# Patient Record
Sex: FEMALE | Race: BLACK OR AFRICAN AMERICAN | NOT HISPANIC OR LATINO | Employment: FULL TIME | URBAN - NONMETROPOLITAN AREA
[De-identification: names, ages, dates, MRNs, and addresses within clinical notes are randomized per-mention and may not be internally consistent; named-entity substitution may affect disease eponyms.]

---

## 2017-04-06 ENCOUNTER — OFFICE VISIT (OUTPATIENT)
Dept: CARDIOLOGY | Facility: CLINIC | Age: 67
End: 2017-04-06

## 2017-04-06 VITALS
DIASTOLIC BLOOD PRESSURE: 80 MMHG | SYSTOLIC BLOOD PRESSURE: 140 MMHG | HEART RATE: 64 BPM | HEIGHT: 63 IN | BODY MASS INDEX: 35.44 KG/M2 | OXYGEN SATURATION: 98 % | WEIGHT: 200 LBS

## 2017-04-06 DIAGNOSIS — E66.09 NON MORBID OBESITY DUE TO EXCESS CALORIES: ICD-10-CM

## 2017-04-06 DIAGNOSIS — E78.2 MIXED HYPERLIPIDEMIA: ICD-10-CM

## 2017-04-06 DIAGNOSIS — R60.0 LOCALIZED EDEMA: ICD-10-CM

## 2017-04-06 DIAGNOSIS — I48.20 CHRONIC ATRIAL FIBRILLATION (HCC): Primary | ICD-10-CM

## 2017-04-06 PROCEDURE — 99214 OFFICE O/P EST MOD 30 MIN: CPT | Performed by: INTERNAL MEDICINE

## 2017-04-06 PROCEDURE — 93000 ELECTROCARDIOGRAM COMPLETE: CPT | Performed by: INTERNAL MEDICINE

## 2017-04-06 RX ORDER — DILTIAZEM HYDROCHLORIDE 240 MG/1
240 CAPSULE, COATED, EXTENDED RELEASE ORAL DAILY
Qty: 30 CAPSULE | Refills: 11 | Status: SHIPPED | OUTPATIENT
Start: 2017-04-06 | End: 2018-03-23 | Stop reason: SDUPTHER

## 2017-04-06 NOTE — PROGRESS NOTES
Reason for Visit: cardiovascular follow up.    HPI:  Araceli Chin is a 66 y.o. female is here today for 6 month follow-up.  She has been doing well for the most part.  Her heart rate has been elevated at times.  Nitza Mica told her to ask about increasing her diltiazem.  She has no other issues or complaints.  Her blood pressure has been well controlled.  She remains active and continues to work.      Previous Cardiac Testing and Procedures:  - Echo (2/2/15) EF 60%, mild MR, LA moderately dilated, mild RV dilation, normal function, moderate RA dilatation, mild to moderate TR, mild PI, RVSP 53 mmHg    Patient Active Problem List   Diagnosis   • Mixed hyperlipidemia   • Clotting disorder   • Atrial fibrillation   • Non morbid obesity due to excess calories       Social History   Substance Use Topics   • Smoking status: Former Smoker     Packs/day: 1.50   • Smokeless tobacco: Never Used   • Alcohol use Yes      Comment: occasional       Family History   Problem Relation Age of Onset   • Hypertension Sister        The following portions of the patient's history were reviewed and updated as appropriate: allergies, current medications, past family history, past medical history, past social history, past surgical history and problem list.      Current Outpatient Prescriptions:   •  alendronate (FOSAMAX) 70 MG tablet, Take 70 mg by mouth every 7 days. Alendronate Sodium, Disp: , Rfl:   •  allopurinol (ZYLOPRIM) 300 MG tablet, Take 300 mg by mouth daily., Disp: , Rfl:   •  apixaban (ELIQUIS) 5 MG tablet tablet, Take 5 mg by mouth 2 (two) times a day., Disp: , Rfl:   •  Calcium Citrate-Vitamin D (CALCIUM + D PO), daily., Disp: , Rfl:   •  Calcium-Phosphorus-Vitamin D (CALCIUM GUMMIES PO), Take  by mouth 2 (two) times a day., Disp: , Rfl:   •  Cholecalciferol (VITAMIN D3) 2000 UNITS capsule, 2,000 Units daily., Disp: , Rfl:   •  Cream Base (PCCA LIPODERM BASE EX), Apply  topically every 12 (twelve) hours as needed.  "Lipoderm 5% Patch, Disp: , Rfl:   •  Cyanocobalamin (VITAMIN B 12 PO), Take 2,500 mcg by mouth., Disp: , Rfl:   •  diltiaZEM CD (CARDIZEM CD) 240 MG 24 hr capsule, Take 1 capsule by mouth Daily., Disp: 30 capsule, Rfl: 11  •  Glucosamine HCl-MSM (GLUCOSAMINE-MSM PO), Take  by mouth 3 (three) times a day., Disp: , Rfl:   •  Multiple Vitamins-Minerals (HAIR SKIN AND NAILS FORMULA PO), daily., Disp: , Rfl:   •  Omega-3 Fatty Acids (FISH OIL PO), 2 (two) times a day., Disp: , Rfl:   •  vitamin B-6 (PYRIDOXINE) 100 MG tablet, 100 mg daily., Disp: , Rfl:     Review of Systems   Constitution: Positive for malaise/fatigue. Negative for chills and fever.   HENT: Negative for congestion, headaches and nosebleeds.    Eyes: Negative for blurred vision and double vision.   Cardiovascular: Positive for irregular heartbeat. Negative for chest pain, leg swelling (gout), palpitations and paroxysmal nocturnal dyspnea.   Respiratory: Positive for cough. Negative for shortness of breath.    Endocrine: Positive for cold intolerance and heat intolerance.   Hematologic/Lymphatic: Bruises/bleeds easily.   Skin: Negative for dry skin, itching and rash.   Musculoskeletal: Positive for arthritis, gout and joint pain. Negative for back pain and neck pain.   Gastrointestinal: Negative for abdominal pain, constipation, diarrhea, heartburn and melena.   Genitourinary: Negative for dysuria, frequency and hematuria.   Neurological: Negative for dizziness, light-headedness and numbness.   Psychiatric/Behavioral: Negative for depression. The patient does not have insomnia and is not nervous/anxious.        Objective   /80 (BP Location: Left arm, Patient Position: Sitting, Cuff Size: Adult)  Pulse 64  Ht 63\" (160 cm)  Wt 200 lb (90.7 kg)  SpO2 98%  BMI 35.43 kg/m2  Physical Exam   Constitutional: She is oriented to person, place, and time. She appears well-developed and well-nourished.   HENT:   Head: Normocephalic and atraumatic. "   Cardiovascular: Normal heart sounds.  An irregularly irregular rhythm present. Tachycardia present.    No murmur heard.  Pulmonary/Chest: Effort normal and breath sounds normal.   Musculoskeletal: She exhibits edema.   Neurological: She is alert and oriented to person, place, and time.   Skin: Skin is warm and dry.   Psychiatric: She has a normal mood and affect.       ECG 12 Lead  Date/Time: 4/6/2017 3:02 PM  Performed by: CONCEPCION RAMOS  Authorized by: CONCEPCION RAMOS   Comparison: compared with previous ECG from 2/18/2017  Similar to previous ECG  Rhythm: atrial fibrillation  Rate: tachycardic  QRS axis: right                ICD-10-CM ICD-9-CM   1. Chronic atrial fibrillation I48.2 427.31   2. Mixed hyperlipidemia E78.2 272.2   3. Non morbid obesity due to excess calories E66.09 278.00   4. Localized edema R60.0 782.3         Assessment/Plan:  1.  Atrial fibrillation: Rate controlled with diltiazem and anticoagulated with apixaban.  Heart rate has been running high at times.  Will increase to 240 mg to better control heart rate.  Titrate up further as needed.      2.  Hyperlipidemia: Managed on omega-3 Fish oil and followed by EP.    3.  Obesity: BMI 35, [place] on diet, exercise, and weight loss.    4.  Lower extremity edema:  Did not tolerate compression stockings.  Plans to try compression socks which are less tight.   on low sodium diet, weight loss, and leg elevation.

## 2017-11-09 ENCOUNTER — OFFICE VISIT (OUTPATIENT)
Dept: CARDIOLOGY | Facility: CLINIC | Age: 67
End: 2017-11-09

## 2017-11-09 VITALS
DIASTOLIC BLOOD PRESSURE: 70 MMHG | SYSTOLIC BLOOD PRESSURE: 130 MMHG | BODY MASS INDEX: 34.91 KG/M2 | HEIGHT: 63 IN | HEART RATE: 71 BPM | WEIGHT: 197 LBS | OXYGEN SATURATION: 98 %

## 2017-11-09 DIAGNOSIS — I48.19 PERSISTENT ATRIAL FIBRILLATION (HCC): Primary | ICD-10-CM

## 2017-11-09 DIAGNOSIS — E66.09 NON MORBID OBESITY DUE TO EXCESS CALORIES: ICD-10-CM

## 2017-11-09 DIAGNOSIS — E78.2 MIXED HYPERLIPIDEMIA: ICD-10-CM

## 2017-11-09 PROBLEM — M10.9 GOUT: Status: ACTIVE | Noted: 2017-11-09

## 2017-11-09 PROCEDURE — 93000 ELECTROCARDIOGRAM COMPLETE: CPT | Performed by: INTERNAL MEDICINE

## 2017-11-09 PROCEDURE — 99213 OFFICE O/P EST LOW 20 MIN: CPT | Performed by: INTERNAL MEDICINE

## 2017-11-09 NOTE — PROGRESS NOTES
Reason for Visit: cardiovascular follow up.    HPI:  Araceli Chin is a 66 y.o. female is here today for 6 month follow-up.  She has been active, particularly at work where she does a lot of walking.  Her heart rate is increased with activity but is typically controlled at rest.  Has intermittent fatigue and tiredness.  Has not had any bleeding problems.  Denies any chest pain, dizziness, syncope, PND, or orthopnea.      Previous Cardiac Testing and Procedures:  - Echo (2/2/15) EF 60%, mild MR, LA moderately dilated, mild RV dilation, normal function, moderate RA dilatation, mild to moderate TR, mild PI, RVSP 53 mmHg    Patient Active Problem List   Diagnosis   • Mixed hyperlipidemia   • Clotting disorder   • Atrial fibrillation   • Non morbid obesity due to excess calories   • Gout       Social History   Substance Use Topics   • Smoking status: Former Smoker     Packs/day: 1.50   • Smokeless tobacco: Never Used   • Alcohol use Yes      Comment: occasional       Family History   Problem Relation Age of Onset   • Hypertension Sister        The following portions of the patient's history were reviewed and updated as appropriate: allergies, current medications, past family history, past medical history, past social history, past surgical history and problem list.      Current Outpatient Prescriptions:   •  alendronate (FOSAMAX) 70 MG tablet, Take 70 mg by mouth every 7 days. Alendronate Sodium, Disp: , Rfl:   •  allopurinol (ZYLOPRIM) 300 MG tablet, Take 300 mg by mouth daily., Disp: , Rfl:   •  apixaban (ELIQUIS) 5 MG tablet tablet, Take 5 mg by mouth 2 (two) times a day., Disp: , Rfl:   •  Calcium Citrate-Vitamin D (CALCIUM + D PO), daily., Disp: , Rfl:   •  Calcium-Phosphorus-Vitamin D (CALCIUM GUMMIES PO), Take  by mouth 2 (two) times a day., Disp: , Rfl:   •  Cholecalciferol (VITAMIN D3) 2000 UNITS capsule, 2,000 Units daily., Disp: , Rfl:   •  Cream Base (PCCA LIPODERM BASE EX), Apply  topically every 12  "(twelve) hours as needed. Lipoderm 5% Patch, Disp: , Rfl:   •  Cyanocobalamin (VITAMIN B 12 PO), Take 2,500 mcg by mouth., Disp: , Rfl:   •  diltiaZEM CD (CARDIZEM CD) 240 MG 24 hr capsule, Take 1 capsule by mouth Daily., Disp: 30 capsule, Rfl: 11  •  Glucosamine HCl-MSM (GLUCOSAMINE-MSM PO), Take  by mouth 3 (three) times a day., Disp: , Rfl:   •  Multiple Vitamins-Minerals (HAIR SKIN AND NAILS FORMULA PO), daily., Disp: , Rfl:   •  Omega-3 Fatty Acids (FISH OIL PO), 2 (two) times a day., Disp: , Rfl:   •  vitamin B-6 (PYRIDOXINE) 100 MG tablet, 100 mg daily., Disp: , Rfl:     Review of Systems   Constitution: Positive for malaise/fatigue. Negative for chills and fever.   HENT: Negative for congestion and nosebleeds.    Eyes: Negative for blurred vision and double vision.   Cardiovascular: Negative for chest pain, irregular heartbeat, leg swelling (gout), palpitations and paroxysmal nocturnal dyspnea.   Respiratory: Positive for cough. Negative for shortness of breath.    Endocrine: Positive for cold intolerance and heat intolerance.   Hematologic/Lymphatic: Bruises/bleeds easily.   Skin: Negative for dry skin, itching and rash.   Musculoskeletal: Positive for arthritis, gout and joint pain. Negative for back pain and neck pain.   Gastrointestinal: Negative for abdominal pain, constipation, diarrhea, heartburn and melena.   Genitourinary: Negative for dysuria, frequency and hematuria.   Neurological: Negative for dizziness, headaches, light-headedness and numbness.   Psychiatric/Behavioral: Negative for depression. The patient does not have insomnia and is not nervous/anxious.        Objective   /70 (BP Location: Left arm, Patient Position: Sitting, Cuff Size: Adult)  Pulse 71  Ht 63\" (160 cm)  Wt 197 lb (89.4 kg)  SpO2 98%  BMI 34.9 kg/m2  Physical Exam   Constitutional: She is oriented to person, place, and time. She appears well-developed and well-nourished.   HENT:   Head: Normocephalic and " atraumatic.   Cardiovascular: Normal rate and normal heart sounds.  An irregularly irregular rhythm present.   No murmur heard.  Pulmonary/Chest: Effort normal and breath sounds normal.   Musculoskeletal: She exhibits no edema.   Neurological: She is alert and oriented to person, place, and time.   Skin: Skin is warm and dry.   Psychiatric: She has a normal mood and affect.       ECG 12 Lead  Date/Time: 11/9/2017 3:31 PM  Performed by: CONCEPCION RAMOS  Authorized by: CONCEPCION RAMOS   Comparison: compared with previous ECG from 4/7/2017  Comparison to previous ECG: Heart rate has decreased  Rhythm: atrial fibrillation  Rate: normal  Clinical impression: low voltage              ICD-10-CM ICD-9-CM   1. Persistent atrial fibrillation I48.1 427.31   2. Mixed hyperlipidemia E78.2 272.2   3. Non morbid obesity due to excess calories E66.09 278.00         Assessment/Plan:  1. Persistent atrial fibrillation: Heart rates appear adequately rate controlled on diltiazem.  Continue anticoagulation with Eliquis.       2.  Hyperlipidemia: Managed on omega-3 Fish oil and followed by PCP.     3.  Obesity: BMI 35.  Continue to  on diet, exercise, and weight loss.

## 2018-03-23 DIAGNOSIS — I48.20 CHRONIC ATRIAL FIBRILLATION (HCC): ICD-10-CM

## 2018-03-23 RX ORDER — DILTIAZEM HYDROCHLORIDE 240 MG/1
240 CAPSULE, COATED, EXTENDED RELEASE ORAL DAILY
Qty: 30 CAPSULE | Refills: 11 | Status: SHIPPED | OUTPATIENT
Start: 2018-03-23 | End: 2019-03-23

## 2018-05-17 ENCOUNTER — OFFICE VISIT (OUTPATIENT)
Dept: CARDIOLOGY | Facility: CLINIC | Age: 68
End: 2018-05-17

## 2018-05-17 VITALS
DIASTOLIC BLOOD PRESSURE: 80 MMHG | HEART RATE: 83 BPM | HEIGHT: 63 IN | SYSTOLIC BLOOD PRESSURE: 130 MMHG | OXYGEN SATURATION: 96 % | WEIGHT: 194 LBS | BODY MASS INDEX: 34.38 KG/M2

## 2018-05-17 DIAGNOSIS — R60.0 LOWER EXTREMITY EDEMA: ICD-10-CM

## 2018-05-17 DIAGNOSIS — E78.2 MIXED HYPERLIPIDEMIA: ICD-10-CM

## 2018-05-17 DIAGNOSIS — I10 ESSENTIAL HYPERTENSION: ICD-10-CM

## 2018-05-17 DIAGNOSIS — I48.20 CHRONIC ATRIAL FIBRILLATION (HCC): Primary | ICD-10-CM

## 2018-05-17 DIAGNOSIS — E66.09 NON MORBID OBESITY DUE TO EXCESS CALORIES: ICD-10-CM

## 2018-05-17 PROCEDURE — 93000 ELECTROCARDIOGRAM COMPLETE: CPT | Performed by: INTERNAL MEDICINE

## 2018-05-17 PROCEDURE — 99214 OFFICE O/P EST MOD 30 MIN: CPT | Performed by: INTERNAL MEDICINE

## 2018-05-17 RX ORDER — HYDROCHLOROTHIAZIDE 25 MG/1
25 TABLET ORAL DAILY
Qty: 30 TABLET | Refills: 11 | Status: SHIPPED | OUTPATIENT
Start: 2018-05-17 | End: 2019-05-07 | Stop reason: SDUPTHER

## 2018-05-17 NOTE — PROGRESS NOTES
Reason for Visit: cardiovascular follow up.    HPI:  Araceli Chin is a 67 y.o. female is here today for follow-up.  She has been having worsening swelling recently.  Had some difficult getting her shoes on.  Wears compression stockings at work.  Just got a new bed that raises her legs.  Denies any chest pain, palpitations, dizziness, syncope, PND, or orthopnea.  She does like processed foods such as potato chips and processed meats.    Previous Cardiac Testing and Procedures:  - Echo (2/2/15) EF 60%, mild MR, LA moderately dilated, mild RV dilation, normal function, moderate RA dilatation, mild to moderate TR, mild PI, RVSP 53 mmHg    Patient Active Problem List   Diagnosis   • Mixed hyperlipidemia   • Clotting disorder   • Atrial fibrillation   • Non morbid obesity due to excess calories   • Gout       Social History   Substance Use Topics   • Smoking status: Former Smoker     Packs/day: 1.50   • Smokeless tobacco: Never Used   • Alcohol use Yes      Comment: occasional       Family History   Problem Relation Age of Onset   • Hypertension Sister        The following portions of the patient's history were reviewed and updated as appropriate: allergies, current medications, past family history, past medical history, past social history, past surgical history and problem list.      Current Outpatient Prescriptions:   •  alendronate (FOSAMAX) 70 MG tablet, Take 70 mg by mouth every 7 days. Alendronate Sodium, Disp: , Rfl:   •  allopurinol (ZYLOPRIM) 300 MG tablet, Take 300 mg by mouth daily., Disp: , Rfl:   •  apixaban (ELIQUIS) 5 MG tablet tablet, Take 5 mg by mouth 2 (two) times a day., Disp: , Rfl:   •  Calcium Citrate-Vitamin D (CALCIUM + D PO), daily., Disp: , Rfl:   •  Calcium-Phosphorus-Vitamin D (CALCIUM GUMMIES PO), Take  by mouth 2 (two) times a day., Disp: , Rfl:   •  Cholecalciferol (VITAMIN D3) 2000 UNITS capsule, 2,000 Units daily., Disp: , Rfl:   •  Cream Base (PCCA LIPODERM BASE EX), Apply   "topically every 12 (twelve) hours as needed. Lipoderm 5% Patch, Disp: , Rfl:   •  Cyanocobalamin (VITAMIN B 12 PO), Take 2,500 mcg by mouth., Disp: , Rfl:   •  diltiaZEM CD (CARDIZEM CD) 240 MG 24 hr capsule, Take 1 capsule by mouth Daily., Disp: 30 capsule, Rfl: 11  •  Glucosamine HCl-MSM (GLUCOSAMINE-MSM PO), Take  by mouth 3 (three) times a day., Disp: , Rfl:   •  Multiple Vitamins-Minerals (HAIR SKIN AND NAILS FORMULA PO), daily., Disp: , Rfl:   •  Omega-3 Fatty Acids (FISH OIL PO), 2 (two) times a day., Disp: , Rfl:   •  vitamin B-6 (PYRIDOXINE) 100 MG tablet, 100 mg daily., Disp: , Rfl:   •  hydrochlorothiazide (HYDRODIURIL) 25 MG tablet, Take 1 tablet by mouth Daily., Disp: 30 tablet, Rfl: 11    Review of Systems   Constitution: Positive for malaise/fatigue. Negative for chills and fever.   HENT: Negative for congestion and nosebleeds.    Eyes: Negative for blurred vision and double vision.   Cardiovascular: Positive for leg swelling (gout). Negative for chest pain, irregular heartbeat, palpitations and paroxysmal nocturnal dyspnea.   Respiratory: Positive for shortness of breath. Negative for cough.    Endocrine: Positive for cold intolerance and heat intolerance.   Hematologic/Lymphatic: Bruises/bleeds easily.   Skin: Negative for dry skin, itching and rash.   Musculoskeletal: Positive for arthritis, gout and joint pain. Negative for back pain and neck pain.   Gastrointestinal: Negative for abdominal pain, constipation, diarrhea, heartburn and melena.   Genitourinary: Negative for dysuria, frequency and hematuria.   Neurological: Negative for dizziness, headaches, light-headedness and numbness.   Psychiatric/Behavioral: Negative for depression. The patient does not have insomnia and is not nervous/anxious.        Objective   /80 (BP Location: Left arm, Patient Position: Sitting, Cuff Size: Adult)   Pulse 83   Ht 160 cm (63\")   Wt 88 kg (194 lb)   SpO2 96%   BMI 34.37 kg/m²   Physical Exam "   Constitutional: She is oriented to person, place, and time. She appears well-developed and well-nourished.   HENT:   Head: Normocephalic and atraumatic.   Cardiovascular: Normal rate and normal heart sounds.  An irregularly irregular rhythm present.   No murmur heard.  Pulmonary/Chest: Effort normal and breath sounds normal.   Musculoskeletal: She exhibits edema (1+ LE edema b/l).   Neurological: She is alert and oriented to person, place, and time.   Skin: Skin is warm and dry.   Psychiatric: She has a normal mood and affect.       ECG 12 Lead  Date/Time: 5/17/2018 3:30 PM  Performed by: CONCEPCION RAMOS  Authorized by: CONCEPCION RAMOS   Comparison: compared with previous ECG from 11/9/2017  Similar to previous ECG  Rhythm: atrial fibrillation  Rate: normal  QRS axis: right  Other findings comments: Poor R wave progression  Clinical impression: low voltage              ICD-10-CM ICD-9-CM   1. Chronic atrial fibrillation I48.2 427.31   2. Mixed hyperlipidemia E78.2 272.2   3. Non morbid obesity due to excess calories E66.09 278.00   4. Lower extremity edema R60.0 782.3   5. Essential hypertension I10 401.9         Assessment/Plan:  1. Persistent atrial fibrillation: Continue rate control with diltiazem and anticoagulation with Eliquis.       2.  Hyperlipidemia: Managed on omega-3 Fish oil area and lipid panel followed by PCP.     3.  Obesity: Patient's Body mass index is 34.37 kg/m². BMI is above normal parameters. Recommendations include: exercise counseling and nutrition counseling.     4.  Lower extremity edema: Likely a component of venous insufficiency with contributions from obesity and diet.  On low-sodium diet.  Encourage regular use of compression stockings and leg elevation.  Will add HCTZ as her blood pressure is mildly elevated today.    5.  Hypertension: Blood pressure is mildly elevated today.  No previous diagnosis of hypertension.  Will add hydrochlorothiazide as this may help with both blood  pressure and lower extremity edema.

## 2018-05-31 ENCOUNTER — RESULTS ENCOUNTER (OUTPATIENT)
Dept: CARDIOLOGY | Facility: CLINIC | Age: 68
End: 2018-05-31

## 2018-05-31 DIAGNOSIS — R60.0 LOWER EXTREMITY EDEMA: ICD-10-CM

## 2018-05-31 DIAGNOSIS — I10 ESSENTIAL HYPERTENSION: ICD-10-CM

## 2018-06-06 DIAGNOSIS — E87.6 HYPOKALEMIA: Primary | ICD-10-CM

## 2018-06-06 RX ORDER — POTASSIUM CHLORIDE 750 MG/1
10 TABLET, FILM COATED, EXTENDED RELEASE ORAL DAILY
Qty: 30 TABLET | Refills: 11 | Status: SHIPPED | OUTPATIENT
Start: 2018-06-06

## 2018-11-29 ENCOUNTER — OFFICE VISIT (OUTPATIENT)
Dept: CARDIOLOGY | Facility: CLINIC | Age: 68
End: 2018-11-29

## 2018-11-29 VITALS
BODY MASS INDEX: 34.02 KG/M2 | SYSTOLIC BLOOD PRESSURE: 112 MMHG | OXYGEN SATURATION: 97 % | HEIGHT: 63 IN | HEART RATE: 84 BPM | DIASTOLIC BLOOD PRESSURE: 70 MMHG | WEIGHT: 192 LBS

## 2018-11-29 DIAGNOSIS — I48.20 CHRONIC ATRIAL FIBRILLATION (HCC): Primary | ICD-10-CM

## 2018-11-29 DIAGNOSIS — E78.2 MIXED HYPERLIPIDEMIA: ICD-10-CM

## 2018-11-29 DIAGNOSIS — I10 ESSENTIAL HYPERTENSION: ICD-10-CM

## 2018-11-29 DIAGNOSIS — E66.09 NON MORBID OBESITY DUE TO EXCESS CALORIES: ICD-10-CM

## 2018-11-29 PROCEDURE — 99214 OFFICE O/P EST MOD 30 MIN: CPT | Performed by: INTERNAL MEDICINE

## 2018-11-29 RX ORDER — AMOXICILLIN AND CLAVULANATE POTASSIUM 500; 125 MG/1; MG/1
1 TABLET, FILM COATED ORAL 3 TIMES DAILY
COMMUNITY
End: 2019-06-20

## 2018-11-29 NOTE — PROGRESS NOTES
Reason for Visit: cardiovascular follow up.    HPI:  Araceli Chin is a 67 y.o. female is here today for 6 month follow-up.  She has recently been dealing with bronchitis.  Has not had any recent cardiac symptoms.  She denies any palpitations, dizziness, chest pain, PND, or orthopnea.  Has had a cough, congestion, and wheezing.  Her blood pressure has been well controlled.      Previous Cardiac Testing and Procedures:  - Echo (2/2/15) EF 60%, mild MR, LA moderately dilated, mild RV dilation, normal function, moderate RA dilatation, mild to moderate TR, mild PI, RVSP 53 mmHg    Patient Active Problem List   Diagnosis   • Mixed hyperlipidemia   • Clotting disorder (CMS/HCC)   • Atrial fibrillation (CMS/HCC)   • Non morbid obesity due to excess calories   • Gout   • Essential hypertension       Social History     Tobacco Use   • Smoking status: Former Smoker     Packs/day: 1.50   • Smokeless tobacco: Never Used   Substance Use Topics   • Alcohol use: Yes     Comment: occasional   • Drug use: No       Family History   Problem Relation Age of Onset   • Hypertension Sister        The following portions of the patient's history were reviewed and updated as appropriate: allergies, current medications, past family history, past medical history, past social history, past surgical history and problem list.      Current Outpatient Medications:   •  alendronate (FOSAMAX) 70 MG tablet, Take 70 mg by mouth every 7 days. Alendronate Sodium, Disp: , Rfl:   •  allopurinol (ZYLOPRIM) 300 MG tablet, Take 300 mg by mouth daily., Disp: , Rfl:   •  amoxicillin-clavulanate (AUGMENTIN) 500-125 MG per tablet, Take 1 tablet by mouth 3 (Three) Times a Day., Disp: , Rfl:   •  apixaban (ELIQUIS) 5 MG tablet tablet, Take 5 mg by mouth 2 (two) times a day., Disp: , Rfl:   •  Calcium Citrate-Vitamin D (CALCIUM + D PO), daily., Disp: , Rfl:   •  Calcium-Phosphorus-Vitamin D (CALCIUM GUMMIES PO), Take  by mouth 2 (two) times a day., Disp: ,  "Rfl:   •  Cholecalciferol (VITAMIN D3) 2000 UNITS capsule, 2,000 Units daily., Disp: , Rfl:   •  Cream Base (PCCA LIPODERM BASE EX), Apply  topically every 12 (twelve) hours as needed. Lipoderm 5% Patch, Disp: , Rfl:   •  Cyanocobalamin (VITAMIN B 12 PO), Take 2,500 mcg by mouth., Disp: , Rfl:   •  diltiaZEM CD (CARDIZEM CD) 240 MG 24 hr capsule, Take 1 capsule by mouth Daily., Disp: 30 capsule, Rfl: 11  •  Glucosamine HCl-MSM (GLUCOSAMINE-MSM PO), Take  by mouth 3 (three) times a day., Disp: , Rfl:   •  hydrochlorothiazide (HYDRODIURIL) 25 MG tablet, Take 1 tablet by mouth Daily., Disp: 30 tablet, Rfl: 11  •  Multiple Vitamins-Minerals (HAIR SKIN AND NAILS FORMULA PO), daily., Disp: , Rfl:   •  Omega-3 Fatty Acids (FISH OIL PO), 2 (two) times a day., Disp: , Rfl:   •  potassium chloride (K-DUR) 10 MEQ CR tablet, Take 1 tablet by mouth Daily., Disp: 30 tablet, Rfl: 11  •  vitamin B-6 (PYRIDOXINE) 100 MG tablet, 100 mg daily., Disp: , Rfl:     Review of Systems   Constitution: Negative for chills and fever.   HENT: Positive for congestion.    Cardiovascular: Positive for irregular heartbeat. Negative for chest pain and paroxysmal nocturnal dyspnea.   Respiratory: Positive for cough, shortness of breath and wheezing.    Skin: Negative for rash.   Gastrointestinal: Negative for abdominal pain and heartburn.   Neurological: Negative for dizziness and numbness.       Objective   /70 (BP Location: Left arm, Patient Position: Sitting, Cuff Size: Adult)   Pulse 84   Ht 160 cm (62.99\")   Wt 87.1 kg (192 lb)   SpO2 97%   BMI 34.02 kg/m²   Physical Exam   Constitutional: She is oriented to person, place, and time. She appears well-developed and well-nourished.   HENT:   Head: Normocephalic and atraumatic.   Cardiovascular: Normal rate and normal heart sounds. An irregularly irregular rhythm present.   No murmur heard.  Pulmonary/Chest: Effort normal and breath sounds normal.   Abdominal: She exhibits no distension. " There is no tenderness.   Musculoskeletal: She exhibits no edema.   Neurological: She is alert and oriented to person, place, and time.   Skin: Skin is warm and dry.   Psychiatric: She has a normal mood and affect.     Procedures      ICD-10-CM ICD-9-CM   1. Chronic atrial fibrillation (CMS/[facility]) I48.2 427.31   2. Mixed hyperlipidemia E78.2 272.2   3. Non morbid obesity due to excess calories E66.09 278.00   4. Essential hypertension I10 401.9         Assessment/Plan:  1. Chronic atrial fibrillation: Continue rate control with diltiazem and anticoagulation with Eliquis.       2.  Hyperlipidemia: Managed on omega-3 Fish oil area and lipid panel followed by PCP.     3.  Obesity: Body mass index is 34.02 kg/m².  Tinea  and last all modification and weight loss.     4.  Hypertension: Blood pressure is well controlled today.  Continue current medications

## 2019-03-27 RX ORDER — DILTIAZEM HYDROCHLORIDE 240 MG/1
240 CAPSULE, COATED, EXTENDED RELEASE ORAL DAILY
Qty: 30 CAPSULE | Refills: 11 | Status: SHIPPED | OUTPATIENT
Start: 2019-03-27 | End: 2019-06-20 | Stop reason: SDUPTHER

## 2019-05-07 DIAGNOSIS — I10 ESSENTIAL HYPERTENSION: ICD-10-CM

## 2019-05-07 DIAGNOSIS — R60.0 LOWER EXTREMITY EDEMA: ICD-10-CM

## 2019-05-07 RX ORDER — HYDROCHLOROTHIAZIDE 25 MG/1
25 TABLET ORAL DAILY
Qty: 30 TABLET | Refills: 11 | Status: SHIPPED | OUTPATIENT
Start: 2019-05-07 | End: 2020-04-30

## 2019-06-04 ENCOUNTER — TELEPHONE (OUTPATIENT)
Dept: CARDIOLOGY | Facility: CLINIC | Age: 69
End: 2019-06-04

## 2019-06-20 ENCOUNTER — OFFICE VISIT (OUTPATIENT)
Dept: CARDIOLOGY | Facility: CLINIC | Age: 69
End: 2019-06-20

## 2019-06-20 VITALS
WEIGHT: 193 LBS | DIASTOLIC BLOOD PRESSURE: 80 MMHG | SYSTOLIC BLOOD PRESSURE: 128 MMHG | HEIGHT: 63 IN | BODY MASS INDEX: 34.2 KG/M2 | OXYGEN SATURATION: 94 % | HEART RATE: 97 BPM

## 2019-06-20 DIAGNOSIS — E78.2 MIXED HYPERLIPIDEMIA: ICD-10-CM

## 2019-06-20 DIAGNOSIS — I48.20 CHRONIC ATRIAL FIBRILLATION (HCC): Primary | ICD-10-CM

## 2019-06-20 DIAGNOSIS — I10 ESSENTIAL HYPERTENSION: ICD-10-CM

## 2019-06-20 DIAGNOSIS — E66.09 NON MORBID OBESITY DUE TO EXCESS CALORIES: ICD-10-CM

## 2019-06-20 PROCEDURE — 93000 ELECTROCARDIOGRAM COMPLETE: CPT | Performed by: INTERNAL MEDICINE

## 2019-06-20 PROCEDURE — 99214 OFFICE O/P EST MOD 30 MIN: CPT | Performed by: INTERNAL MEDICINE

## 2019-06-20 RX ORDER — DILTIAZEM HYDROCHLORIDE 300 MG/1
300 CAPSULE, COATED, EXTENDED RELEASE ORAL DAILY
Qty: 30 CAPSULE | Refills: 11 | Status: SHIPPED | OUTPATIENT
Start: 2019-06-20 | End: 2020-06-22

## 2019-06-20 NOTE — PROGRESS NOTES
Reason for Visit: cardiovascular follow up.    HPI:  Araceli Chin is a 68 y.o. female is here today for follow-up.  She has been doing well for the most part.  She has no new complaints or issues.  She denies any chest pain, palpitation, dizziness, syncope, PND, or orthopnea.  She has some fatigue that is relatively unchanged.  She notes an intermittent cough and wants to make sure it is not related to her heart.  She has not noticed any racing of her heart.  Her blood pressure has been reasonably well controlled.  Heart rate is borderline today at 97 bpm on her EKG.    Previous Cardiac Testing and Procedures:  - Echo (2/2/15) EF 60%, mild MR, LA moderately dilated, mild RV dilation, normal function, moderate RA dilatation, mild to moderate TR, mild PI, RVSP 53 mmHg    Patient Active Problem List   Diagnosis   • Mixed hyperlipidemia   • Clotting disorder (CMS/HCC)   • Atrial fibrillation (CMS/HCC)   • Non morbid obesity due to excess calories   • Gout   • Essential hypertension       Social History     Tobacco Use   • Smoking status: Former Smoker     Packs/day: 1.50   • Smokeless tobacco: Never Used   Substance Use Topics   • Alcohol use: Yes     Comment: occasional   • Drug use: No       Family History   Problem Relation Age of Onset   • Hypertension Sister        The following portions of the patient's history were reviewed and updated as appropriate: allergies, current medications, past family history, past medical history, past social history, past surgical history and problem list.      Current Outpatient Medications:   •  allopurinol (ZYLOPRIM) 300 MG tablet, Take 300 mg by mouth daily., Disp: , Rfl:   •  apixaban (ELIQUIS) 5 MG tablet tablet, Take 1 tablet by mouth Every 12 (Twelve) Hours., Disp: 60 tablet, Rfl: 11  •  Calcium Citrate-Vitamin D (CALCIUM + D PO), daily., Disp: , Rfl:   •  Calcium-Phosphorus-Vitamin D (CALCIUM GUMMIES PO), Take  by mouth 2 (two) times a day., Disp: , Rfl:   •   "Cholecalciferol (VITAMIN D3) 2000 UNITS capsule, 2,000 Units daily., Disp: , Rfl:   •  Cream Base (PCCA LIPODERM BASE EX), Apply  topically every 12 (twelve) hours as needed. Lipoderm 5% Patch, Disp: , Rfl:   •  Cyanocobalamin (VITAMIN B 12 PO), Take 2,500 mcg by mouth., Disp: , Rfl:   •  diltiaZEM CD (CARDIZEM CD) 300 MG 24 hr capsule, Take 1 capsule by mouth Daily., Disp: 30 capsule, Rfl: 11  •  Glucosamine HCl-MSM (GLUCOSAMINE-MSM PO), Take  by mouth 3 (three) times a day., Disp: , Rfl:   •  hydrochlorothiazide (HYDRODIURIL) 25 MG tablet, Take 1 tablet by mouth Daily., Disp: 30 tablet, Rfl: 11  •  Multiple Vitamins-Minerals (HAIR SKIN AND NAILS FORMULA PO), daily., Disp: , Rfl:   •  Omega-3 Fatty Acids (FISH OIL PO), 2 (two) times a day., Disp: , Rfl:   •  vitamin B-6 (PYRIDOXINE) 100 MG tablet, 100 mg daily., Disp: , Rfl:   •  potassium chloride (K-DUR) 10 MEQ CR tablet, Take 1 tablet by mouth Daily., Disp: 30 tablet, Rfl: 11    Review of Systems   Constitution: Positive for malaise/fatigue. Negative for chills and fever.   Cardiovascular: Negative for chest pain and paroxysmal nocturnal dyspnea.   Respiratory: Positive for cough. Negative for shortness of breath.    Skin: Negative for rash.   Gastrointestinal: Negative for abdominal pain and heartburn.   Neurological: Negative for dizziness and numbness.       Objective   /80 (BP Location: Left arm, Patient Position: Sitting, Cuff Size: Adult)   Pulse 97   Ht 160 cm (62.99\")   Wt 87.5 kg (193 lb)   SpO2 94%   BMI 34.20 kg/m²   Physical Exam   Constitutional: She is oriented to person, place, and time. She appears well-developed and well-nourished.   HENT:   Head: Normocephalic and atraumatic.   Cardiovascular: Normal rate and normal heart sounds. An irregularly irregular rhythm present.   No murmur heard.  Pulmonary/Chest: Effort normal and breath sounds normal.   Abdominal: She exhibits no distension.   Musculoskeletal: She exhibits no edema. "   Neurological: She is alert and oriented to person, place, and time.   Skin: Skin is warm and dry.   Psychiatric: She has a normal mood and affect.       ECG 12 Lead  Date/Time: 6/20/2019 4:32 PM  Performed by: Moise Posadas MD  Authorized by: Moise Psoadas MD   Comparison: compared with previous ECG from 5/17/2018  Similar to previous ECG  Rhythm: atrial fibrillation  Rate: normal  Other findings: non-specific ST-T wave changes and low voltage              ICD-10-CM ICD-9-CM   1. Chronic atrial fibrillation (CMS/HCC) I48.2 427.31   2. Mixed hyperlipidemia E78.2 272.2   3. Non morbid obesity due to excess calories E66.09 278.00   4. Essential hypertension I10 401.9         Assessment/Plan:  1. Chronic atrial fibrillation: Heart rate is borderline today at 97 bpm.  Will titrate up diltiazem to 300 mg.  Continue anticoagulation with Eliquis.       2.  Hyperlipidemia: Managed on omega-3 Fish oil by PCP.     3.  Obesity: Body mass index is 34.2 kg/m².    Continue to  on lifestyle modification.     4.  Hypertension: Blood pressure remains well controlled on current therapy.

## 2019-12-19 ENCOUNTER — OFFICE VISIT (OUTPATIENT)
Dept: CARDIOLOGY | Facility: CLINIC | Age: 69
End: 2019-12-19

## 2019-12-19 VITALS
DIASTOLIC BLOOD PRESSURE: 80 MMHG | BODY MASS INDEX: 35.61 KG/M2 | HEART RATE: 83 BPM | WEIGHT: 201 LBS | HEIGHT: 63 IN | SYSTOLIC BLOOD PRESSURE: 116 MMHG | OXYGEN SATURATION: 98 %

## 2019-12-19 DIAGNOSIS — R29.818 SUSPECTED SLEEP APNEA: ICD-10-CM

## 2019-12-19 DIAGNOSIS — E66.09 NON MORBID OBESITY DUE TO EXCESS CALORIES: ICD-10-CM

## 2019-12-19 DIAGNOSIS — E78.2 MIXED HYPERLIPIDEMIA: ICD-10-CM

## 2019-12-19 DIAGNOSIS — I48.21 PERMANENT ATRIAL FIBRILLATION (HCC): Primary | ICD-10-CM

## 2019-12-19 DIAGNOSIS — I10 ESSENTIAL HYPERTENSION: ICD-10-CM

## 2019-12-19 PROCEDURE — 99214 OFFICE O/P EST MOD 30 MIN: CPT | Performed by: INTERNAL MEDICINE

## 2019-12-19 PROCEDURE — 93000 ELECTROCARDIOGRAM COMPLETE: CPT | Performed by: INTERNAL MEDICINE

## 2020-04-30 DIAGNOSIS — I10 ESSENTIAL HYPERTENSION: ICD-10-CM

## 2020-04-30 DIAGNOSIS — R60.0 LOWER EXTREMITY EDEMA: ICD-10-CM

## 2020-04-30 RX ORDER — HYDROCHLOROTHIAZIDE 25 MG/1
TABLET ORAL
Qty: 90 TABLET | Refills: 4 | Status: SHIPPED | OUTPATIENT
Start: 2020-04-30

## 2020-04-30 NOTE — TELEPHONE ENCOUNTER
Pt calling to request refill of HCTZ.  Pt uses [facility] in [place].  Can reach pt at #[phone]/emani

## 2020-06-22 RX ORDER — DILTIAZEM HYDROCHLORIDE 300 MG/1
CAPSULE, EXTENDED RELEASE ORAL
Qty: 90 CAPSULE | Refills: 4 | Status: SHIPPED | OUTPATIENT
Start: 2020-06-22

## 2020-06-23 ENCOUNTER — TELEPHONE (OUTPATIENT)
Dept: CARDIOLOGY | Facility: CLINIC | Age: 70
End: 2020-06-23

## 2020-06-23 NOTE — TELEPHONE ENCOUNTER
Patient returned call and stated she would no longer be seeing  and that she is switching cardiologists to Dr. Nguyen at [facility].

## 2020-06-23 NOTE — TELEPHONE ENCOUNTER
Tried calling patient to see if she had a new pt appt today with Dr. Holland at [facility]. Left voicemail 6-23-20    If patient calls back please verify who she will be seeing for cardiology in the future.    Thanks